# Patient Record
Sex: FEMALE | Race: WHITE | Employment: FULL TIME
[De-identification: names, ages, dates, MRNs, and addresses within clinical notes are randomized per-mention and may not be internally consistent; named-entity substitution may affect disease eponyms.]

---

## 2021-02-02 ENCOUNTER — NURSE TRIAGE (OUTPATIENT)
Dept: OTHER | Facility: CLINIC | Age: 61
End: 2021-02-02

## 2021-02-02 ENCOUNTER — OFFICE VISIT (OUTPATIENT)
Dept: PRIMARY CARE CLINIC | Age: 61
End: 2021-02-02
Payer: COMMERCIAL

## 2021-02-02 VITALS
OXYGEN SATURATION: 97 % | SYSTOLIC BLOOD PRESSURE: 130 MMHG | HEART RATE: 84 BPM | HEIGHT: 63 IN | TEMPERATURE: 96.6 F | BODY MASS INDEX: 34.02 KG/M2 | WEIGHT: 192 LBS | DIASTOLIC BLOOD PRESSURE: 72 MMHG

## 2021-02-02 DIAGNOSIS — L03.114 CELLULITIS OF LEFT UPPER EXTREMITY: Primary | ICD-10-CM

## 2021-02-02 PROCEDURE — G8417 CALC BMI ABV UP PARAM F/U: HCPCS | Performed by: NURSE PRACTITIONER

## 2021-02-02 PROCEDURE — G8484 FLU IMMUNIZE NO ADMIN: HCPCS | Performed by: NURSE PRACTITIONER

## 2021-02-02 PROCEDURE — 99204 OFFICE O/P NEW MOD 45 MIN: CPT | Performed by: NURSE PRACTITIONER

## 2021-02-02 PROCEDURE — G8427 DOCREV CUR MEDS BY ELIG CLIN: HCPCS | Performed by: NURSE PRACTITIONER

## 2021-02-02 PROCEDURE — 1036F TOBACCO NON-USER: CPT | Performed by: NURSE PRACTITIONER

## 2021-02-02 PROCEDURE — 3017F COLORECTAL CA SCREEN DOC REV: CPT | Performed by: NURSE PRACTITIONER

## 2021-02-02 RX ORDER — ACETAMINOPHEN 500 MG
1000 TABLET ORAL EVERY 4 HOURS PRN
COMMUNITY
End: 2022-08-23

## 2021-02-02 RX ORDER — BIOTIN 10000 MCG
1 CAPSULE ORAL DAILY
COMMUNITY

## 2021-02-02 RX ORDER — CEPHALEXIN 500 MG/1
500 CAPSULE ORAL 3 TIMES DAILY
Qty: 30 CAPSULE | Refills: 0 | Status: SHIPPED | OUTPATIENT
Start: 2021-02-02 | End: 2021-02-12

## 2021-02-02 RX ORDER — KETOTIFEN FUMARATE 0.35 MG/ML
1 SOLUTION/ DROPS OPHTHALMIC 2 TIMES DAILY
COMMUNITY

## 2021-02-02 RX ORDER — OMEPRAZOLE 20 MG/1
2 CAPSULE, DELAYED RELEASE ORAL DAILY
COMMUNITY
Start: 2020-11-24 | End: 2021-05-24

## 2021-02-02 RX ORDER — VIT C/B6/B5/MAGNESIUM/HERB 173 50-5-6-5MG
1 CAPSULE ORAL DAILY
COMMUNITY

## 2021-02-02 RX ORDER — FLUCONAZOLE 150 MG/1
150 TABLET ORAL DAILY
Qty: 3 TABLET | Refills: 0 | Status: SHIPPED | OUTPATIENT
Start: 2021-02-02 | End: 2021-02-05

## 2021-02-02 SDOH — ECONOMIC STABILITY: TRANSPORTATION INSECURITY
IN THE PAST 12 MONTHS, HAS LACK OF TRANSPORTATION KEPT YOU FROM MEETINGS, WORK, OR FROM GETTING THINGS NEEDED FOR DAILY LIVING?: NOT ASKED

## 2021-02-02 SDOH — ECONOMIC STABILITY: TRANSPORTATION INSECURITY
IN THE PAST 12 MONTHS, HAS THE LACK OF TRANSPORTATION KEPT YOU FROM MEDICAL APPOINTMENTS OR FROM GETTING MEDICATIONS?: NOT ASKED

## 2021-02-02 SDOH — HEALTH STABILITY: MENTAL HEALTH: HOW MANY STANDARD DRINKS CONTAINING ALCOHOL DO YOU HAVE ON A TYPICAL DAY?: NOT ASKED

## 2021-02-02 SDOH — ECONOMIC STABILITY: FOOD INSECURITY: WITHIN THE PAST 12 MONTHS, YOU WORRIED THAT YOUR FOOD WOULD RUN OUT BEFORE YOU GOT MONEY TO BUY MORE.: NEVER TRUE

## 2021-02-02 ASSESSMENT — ENCOUNTER SYMPTOMS
RESPIRATORY NEGATIVE: 1
COLOR CHANGE: 1
EYES NEGATIVE: 1
GASTROINTESTINAL NEGATIVE: 1
ALLERGIC/IMMUNOLOGIC NEGATIVE: 1

## 2021-02-02 ASSESSMENT — PATIENT HEALTH QUESTIONNAIRE - PHQ9
SUM OF ALL RESPONSES TO PHQ QUESTIONS 1-9: 0
1. LITTLE INTEREST OR PLEASURE IN DOING THINGS: 0

## 2021-02-02 NOTE — TELEPHONE ENCOUNTER
Reason for Disposition   Fever and red area (or area very tender to touch)    Answer Assessment - Initial Assessment Questions  1. ONSET: \"When did the pain start? \"    2/1    2. LOCATION: \"Where is the pain located? \"    Started on Left lower arm, spreading up arm. Does not reach shoulder. 3. PAIN: \"How bad is the pain? \" (Scale 1-10; or mild, moderate, severe)    - MILD (1-3): doesn't interfere with normal activities    - MODERATE (4-7): interferes with normal activities (e.g., work or school) or awakens from sleep    - SEVERE (8-10): excruciating pain, unable to do any normal activities, unable to hold a cup of water    Tender, 3    4. WORK OR EXERCISE: \"Has there been any recent work or exercise that involved this part of the body? \"    No    5. CAUSE: \"What do you think is causing the arm pain? \"  Unsure, has hx of lymphoedema in same. Surgery 9 ago. 6. OTHER SYMPTOMS: \"Do you have any other symptoms? \" (e.g., neck pain, swelling, rash, fever, numbness, weakness)    Swelling, redness, fever    7. PREGNANCY: \"Is there any chance you are pregnant? \" \"When was your last menstrual period? \"  NO    Protocols used: ARM PAIN-ADULT-[place]    Patient called Jael at [facility] pre-service center [facility])  with red flag complaint. Brief description of triage: pt calls c/o \"LEFT arm cellulitis\"    Triage indicates for patient to be seen today. Pt SCREENS RED. Pt advised if unable to obtain MD visit today to go to [facility] or ED. 2nd level triage completed with Dwayne Mccoy NP; provider recommends patient be seen by her MD, Dr. Jose Escobar or ER today. Care advice provided, patient verbalizes understanding; denies any other questions or concerns; instructed to call back for any new or worsening symptoms. Writer provided warm transfer to [place] at [facility] for appointment scheduling. Attention Provider: Thank you for allowing me to participate in the care of your patient.   The patient was connected to triage in

## 2021-02-02 NOTE — PROGRESS NOTES
Lan Richmond [address] [place] [address]  Daria Sinclair [id number]  Dept: [phone]     Patient is here to establish care. Here for evaluation of the following medical concerns:New Patient and Cellulitis (left arm)    Jose Parmar is a 64 y.o. female who presents to the office today for a first visit and to establish a relationship with a new primary care provider. Pt is here to establish care (pt of Dr. Jose Escobar at previous office) and has complaints of left arm pain, swelling, redness that started last night. Last night it was just an area on her upper arm but today has spread to the lower arm and hand as well. The left arm is erythematous, warm to touch, tender to touch and edematous of left upper arm extending to left lower arm. Pt says she had a slight fever this morning of 99.7 and she took motrin around 8am. I discussed with pt starting antibiotics right away, but if it doesn't improve by tomorrow or if it continues to worsen, I want her to go to the ER. If she develops high fever be seen in ER. She requested diflucan due to antibiotics usually causing vaginal yeast infections. 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 Pt will f/u in office in 3 days but agreed that if she becomes worse or does not improve she will go to ER. Patient Active Problem List    Diagnosis Date Noted    Cellulitis of left upper extremity 2021     Patient's BMI is Body mass index is 34.01 kg/m². Social History     Tobacco Use    Smoking status: Former Smoker     Packs/day: 0.50     Years: 10.00     Pack years: 5.00     Types: Cigarettes     Quit date:      Years since quittin.1    Smokeless tobacco: Never Used   Substance Use Topics    Alcohol use: Not Currently    Drug use: Never      History reviewed. No pertinent past medical history.    PHQ-9 Total Score: 0 (2021 11:07 AM) Exam  Vitals signs and nursing note reviewed. Constitutional:       Appearance: Normal appearance. HENT:      Nose: Nose normal.      Mouth/Throat:      Mouth: Mucous membranes are moist.      Pharynx: No oropharyngeal exudate. Eyes:      Extraocular Movements: Extraocular movements intact. Conjunctiva/sclera: Conjunctivae normal.      Pupils: Pupils are equal, round, and reactive to light. Neck:      Musculoskeletal: Normal range of motion. Cardiovascular:      Rate and Rhythm: Normal rate and regular rhythm. Pulses: Normal pulses. Heart sounds: Normal heart sounds. Pulmonary:      Effort: Pulmonary effort is normal. No respiratory distress. Breath sounds: Normal breath sounds. No stridor. No wheezing. Abdominal:      General: Abdomen is flat. Bowel sounds are normal.      Palpations: Abdomen is soft. Tenderness: There is no abdominal tenderness. Musculoskeletal: Normal range of motion. Left elbow: She exhibits swelling. Tenderness found. Left wrist: She exhibits tenderness and swelling. Left upper arm: She exhibits tenderness, swelling and edema. Left forearm: She exhibits tenderness, swelling and edema. Skin:     General: Skin is warm and dry. Capillary Refill: Capillary refill takes less than 2 seconds. Findings: Erythema present. Neurological:      General: No focal deficit present. Mental Status: She is alert. Psychiatric:         Mood and Affect: Mood normal.         Behavior: Behavior normal.         Thought Content:  Thought content normal.       No results found for: WBC, HGB, HCT, MCV, PLT  No results found for: NA, K, CL, CO2, BUN, CREATININE, GLUCOSE, CALCIUM   No results found for: ALT, AST, GGT, ALKPHOS, BILITOT  No results found for: TSHFT4, TSH  No results found for: CHOL  No results found for: TRIG  No results found for: HDL  No results found for: LDLCALC, LDLCHOLESTEROL  No results found for: CHOLHDLRATIO  No results found for: LABA1C  No results found for: [id number]  No results found for: FOLATE  No results found for: IRON, TIBC, FERRITIN  No results found for: VITD25  I personally reviewed testing with patient. Otherwise labs within normal limits  ASSESSMENT AND PLAN  1. Cellulitis of left upper extremity    - cephALEXin (KEFLEX) 500 MG capsule; Take 1 capsule by mouth 3 times daily for 10 days  Dispense: 30 capsule; Refill: 0  - fluconazole (DIFLUCAN) 150 MG tablet; Take 1 tablet by mouth daily for 3 days  Dispense: 3 tablet; Refill: 0     No orders of the defined types were placed in this encounter. Orders Placed This Encounter   Medications    cephALEXin (KEFLEX) 500 MG capsule     Sig: Take 1 capsule by mouth 3 times daily for 10 days     Dispense:  30 capsule     Refill:  0    fluconazole (DIFLUCAN) 150 MG tablet     Sig: Take 1 tablet by mouth daily for 3 days     Dispense:  3 tablet     Refill:  0      Data Unavailable  Health Maintenance Due   Topic Date Due    Hepatitis C screen  1960    HIV screen  10/21/1977    DTaP/Tdap/Td vaccine (1 - Tdap) 10/21/1981    Cervical cancer screen  10/21/1983    Lipid screen  10/21/2002    Diabetes screen  10/21/2002    Breast cancer screen  10/21/2012    Shingles Vaccine (1 of 2) 10/21/2012    Colon cancer screen colonoscopy  10/21/2012    Flu vaccine (1) 09/01/2020      There are no discontinued medications. The patient's past medical, surgical, social, and family history as well as her   current medications and allergies were reviewed as documented in today's encounter. Medications, labs, diagnostic studies, consultations and follow-up as documented in this encounter. Return in about 3 days (around 2/5/2021).   Future Appointments   Date Time Provider Zayra Moore   2/5/2021  8:00 AM CURT Riley CNP     This note was completed by using the assistance of a speech-recognition program. However, inadvertent computerized transcription errors may be present. Although every effort was made to ensure accuracy, no guarantees can be provided that every mistake has been identified and corrected by editing.   Electronically signed by CURT Mendez CNP on 2/2/2021 at 12:13 PM

## 2021-02-05 ENCOUNTER — OFFICE VISIT (OUTPATIENT)
Dept: PRIMARY CARE CLINIC | Age: 61
End: 2021-02-05
Payer: COMMERCIAL

## 2021-02-05 VITALS
TEMPERATURE: 96.4 F | DIASTOLIC BLOOD PRESSURE: 82 MMHG | HEART RATE: 72 BPM | SYSTOLIC BLOOD PRESSURE: 134 MMHG | WEIGHT: 193.2 LBS | BODY MASS INDEX: 34.23 KG/M2 | HEIGHT: 63 IN | OXYGEN SATURATION: 96 %

## 2021-02-05 DIAGNOSIS — Z13.220 SCREENING FOR HYPERLIPIDEMIA: ICD-10-CM

## 2021-02-05 DIAGNOSIS — Z13.1 SCREENING FOR DIABETES MELLITUS: ICD-10-CM

## 2021-02-05 DIAGNOSIS — L03.114 CELLULITIS OF LEFT UPPER EXTREMITY: Primary | ICD-10-CM

## 2021-02-05 PROCEDURE — 3017F COLORECTAL CA SCREEN DOC REV: CPT | Performed by: NURSE PRACTITIONER

## 2021-02-05 PROCEDURE — G8427 DOCREV CUR MEDS BY ELIG CLIN: HCPCS | Performed by: NURSE PRACTITIONER

## 2021-02-05 PROCEDURE — G8417 CALC BMI ABV UP PARAM F/U: HCPCS | Performed by: NURSE PRACTITIONER

## 2021-02-05 PROCEDURE — 99213 OFFICE O/P EST LOW 20 MIN: CPT | Performed by: NURSE PRACTITIONER

## 2021-02-05 PROCEDURE — 1036F TOBACCO NON-USER: CPT | Performed by: NURSE PRACTITIONER

## 2021-02-05 PROCEDURE — G8484 FLU IMMUNIZE NO ADMIN: HCPCS | Performed by: NURSE PRACTITIONER

## 2021-02-05 ASSESSMENT — ENCOUNTER SYMPTOMS
RESPIRATORY NEGATIVE: 1
EYES NEGATIVE: 1
GASTROINTESTINAL NEGATIVE: 1
ALLERGIC/IMMUNOLOGIC NEGATIVE: 1

## 2021-02-05 NOTE — PROGRESS NOTES
[facility] [address] [place] [id number]  Dept: [phone]       Dannielle Zaman is a 64 y.o. female who presents to the office today for evaluation of Cellulitis (follow up)    Pt is here for f.u after being seen 3 days ago for left arm cellulitis. The left arm has greatly improved, it is no longer erythematous or warm to touch and the edema has decreased. Pt says she feels much better. No fever, still feeling a bit fatigued. I instructed pt to continue keflex and she can resume wearing her compression sleeve. Let me know if symptoms return, but overall it looks much better and I believe will continue to fully subside. Pt understands and agreeable to plan. Patient Active Problem List    Diagnosis Date Noted    Cellulitis of left upper extremity 2021     Patient's BMI is Body mass index is 34.22 kg/m². Social History     Tobacco Use    Smoking status: Former Smoker     Packs/day: 0.50     Years: 10.00     Pack years: 5.00     Types: Cigarettes     Quit date:      Years since quittin.1    Smokeless tobacco: Never Used   Substance Use Topics    Alcohol use: Not Currently    Drug use: Never      History reviewed. No pertinent past medical history. PHQ-9 Total Score: 0 (2021 11:07 AM)     Immunization:    There is no immunization history on file for this patient. History reviewed. No pertinent surgical history. History reviewed. No pertinent family history.   Current Outpatient Medications   Medication Sig Dispense Refill    omeprazole (PRILOSEC) 20 MG delayed release capsule Take 2 capsules by mouth daily      ketotifen (ZADITOR) 0.025 % ophthalmic solution Apply 1 drop to eye 2 times daily      acetaminophen (TYLENOL) 500 MG tablet Take 1,000 mg by mouth every 4 hours as needed      FENUGREEK PO Take 1 tablet by mouth daily      Biotin 10 MG CAPS Take 1 capsule by mouth daily  Apple Cider Vinegar 188 MG CAPS Take 1 capsule by mouth daily      Turmeric (QC TUMERIC COMPLEX) 500 MG CAPS Take 1 capsule by mouth daily      cephALEXin (KEFLEX) 500 MG capsule Take 1 capsule by mouth 3 times daily for 10 days 30 capsule 0    fluconazole (DIFLUCAN) 150 MG tablet Take 1 tablet by mouth daily for 3 days 3 tablet 0     No current facility-administered medications for this visit. The patient's past medical, surgical, social, and family history as well as her current medications and allergies were reviewed as documented in today's encounter. Review of Systems   Constitutional: Positive for fatigue. Negative for fever. HENT: Negative. Eyes: Negative. Respiratory: Negative. Cardiovascular: Negative. Gastrointestinal: Negative. Endocrine: Negative. Genitourinary: Negative. Musculoskeletal: Negative. Swelling of left arm    Skin: Negative. Allergic/Immunologic: Negative. Neurological: Negative. Hematological: Negative. Psychiatric/Behavioral: Negative. All other systems reviewed and are negative. /82 (Site: Right Upper Arm, Position: Sitting, Cuff Size: Medium Adult)   Pulse 72   Temp 96.4 °F (35.8 °C) (Temporal)   Ht 5' 3\" (1.6 m)   Wt 193 lb 3.2 oz (87.6 kg)   SpO2 96%   BMI 34.22 kg/m²   Physical Exam  Vitals signs and nursing note reviewed. Constitutional:       Appearance: Normal appearance. HENT:      Nose: Nose normal.      Mouth/Throat:      Mouth: Mucous membranes are moist.      Pharynx: No oropharyngeal exudate. Eyes:      Extraocular Movements: Extraocular movements intact. Conjunctiva/sclera: Conjunctivae normal.      Pupils: Pupils are equal, round, and reactive to light. Neck:      Musculoskeletal: Normal range of motion. Cardiovascular:      Rate and Rhythm: Normal rate and regular rhythm. Pulses: Normal pulses. Heart sounds: Normal heart sounds.    Pulmonary: Effort: Pulmonary effort is normal. No respiratory distress. Breath sounds: Normal breath sounds. No stridor. No wheezing. Abdominal:      General: Abdomen is flat. Bowel sounds are normal.      Palpations: Abdomen is soft. Tenderness: There is no abdominal tenderness. Musculoskeletal: Normal range of motion. Left upper arm: She exhibits swelling and edema. Arms:    Skin:     General: Skin is warm and dry. Capillary Refill: Capillary refill takes less than 2 seconds. Findings: No erythema. Neurological:      General: No focal deficit present. Mental Status: She is alert. Psychiatric:         Mood and Affect: Mood normal.         Behavior: Behavior normal.         Thought Content: Thought content normal.       No results found for: WBC, HGB, HCT, MCV, PLT  No results found for: NA, K, CL, CO2, BUN, CREATININE, GLUCOSE, CALCIUM   No results found for: ALT, AST, GGT, ALKPHOS, BILITOT  No results found for: TSHFT4, TSH  No results found for: CHOL  No results found for: TRIG  No results found for: HDL  No results found for: LDLCALC, LDLCHOLESTEROL  No results found for: CHOLHDLRATIO  No results found for: LABA1C  No results found for: [id number]  No results found for: FOLATE  No results found for: IRON, TIBC, FERRITIN  No results found for: VITD25  I personally reviewed testing with patient. Otherwise labs within normal limits  ASSESSMENT AND PLAN  1. Cellulitis of left upper extremity      2. Screening for diabetes mellitus    - Basic Metabolic Panel; Future    3. Screening for hyperlipidemia    - Lipid Panel;  Future     Orders Placed This Encounter   Procedures    HM MAMMOGRAPHY     This order was created through External Result Entry    Lipid Panel     Standing Status:   Future     Standing Expiration Date:   2/5/2022     Order Specific Question:   Is Patient Fasting?/# of Hours     Answer:   10 hours fasting    Basic Metabolic Panel     Standing Status:   Future Standing Expiration Date:   2/5/2022     PAP SMEAR     This order was created through External Result Entry     No orders of the defined types were placed in this encounter. Data Unavailable  Health Maintenance Due   Topic Date Due    DTaP/Tdap/Td vaccine (1 - Tdap) 10/21/1981    Lipid screen  10/21/2002    Diabetes screen  10/21/2002    Shingles Vaccine (1 of 2) 10/21/2012    Colon cancer screen colonoscopy  10/21/2012    Flu vaccine (1) 09/01/2020      There are no discontinued medications. The patient's past medical, surgical, social, and family history as well as her   current medications and allergies were reviewed as documented in today's encounter. Medications, labs, diagnostic studies, consultations and follow-up as documented in this encounter. No follow-ups on file. No future appointments. This note was completed by using the assistance of a speech-recognition program. However, inadvertent computerized transcription errors may be present. Although every effort was made to ensure accuracy, no guarantees can be provided that every mistake has been identified and corrected by editing.   Electronically signed by Rondell Cheadle, APRN - CNP on 2/5/2021 at 12:57 PM

## 2021-06-09 ENCOUNTER — NURSE TRIAGE (OUTPATIENT)
Dept: OTHER | Facility: CLINIC | Age: 61
End: 2021-06-09

## 2021-06-09 ENCOUNTER — OFFICE VISIT (OUTPATIENT)
Dept: PRIMARY CARE CLINIC | Age: 61
End: 2021-06-09
Payer: COMMERCIAL

## 2021-06-09 VITALS
SYSTOLIC BLOOD PRESSURE: 110 MMHG | HEART RATE: 79 BPM | BODY MASS INDEX: 35.19 KG/M2 | OXYGEN SATURATION: 97 % | DIASTOLIC BLOOD PRESSURE: 70 MMHG | HEIGHT: 62 IN | WEIGHT: 191.2 LBS

## 2021-06-09 DIAGNOSIS — J30.1 SEASONAL ALLERGIC RHINITIS DUE TO POLLEN: Primary | ICD-10-CM

## 2021-06-09 PROBLEM — L03.114 CELLULITIS OF LEFT UPPER EXTREMITY: Status: RESOLVED | Noted: 2021-02-02 | Resolved: 2021-06-09

## 2021-06-09 PROCEDURE — 3017F COLORECTAL CA SCREEN DOC REV: CPT | Performed by: NURSE PRACTITIONER

## 2021-06-09 PROCEDURE — G8427 DOCREV CUR MEDS BY ELIG CLIN: HCPCS | Performed by: NURSE PRACTITIONER

## 2021-06-09 PROCEDURE — 99213 OFFICE O/P EST LOW 20 MIN: CPT | Performed by: NURSE PRACTITIONER

## 2021-06-09 PROCEDURE — 1036F TOBACCO NON-USER: CPT | Performed by: NURSE PRACTITIONER

## 2021-06-09 PROCEDURE — G8417 CALC BMI ABV UP PARAM F/U: HCPCS | Performed by: NURSE PRACTITIONER

## 2021-06-09 RX ORDER — FLUTICASONE PROPIONATE 50 MCG
1 SPRAY, SUSPENSION (ML) NASAL DAILY
COMMUNITY

## 2021-06-09 RX ORDER — ZINC GLUCONATE 50 MG
50 TABLET ORAL DAILY
COMMUNITY

## 2021-06-09 RX ORDER — DIPHENHYDRAMINE HCL 25 MG
25 CAPSULE ORAL NIGHTLY PRN
Qty: 30 CAPSULE | Refills: 3 | COMMUNITY
Start: 2021-06-09 | End: 2021-06-19

## 2021-06-09 RX ORDER — OMEGA-3S/DHA/EPA/FISH OIL/D3 300MG-1000
400 CAPSULE ORAL DAILY
COMMUNITY

## 2021-06-09 RX ORDER — MULTIVIT WITH MINERALS/LUTEIN
250 TABLET ORAL DAILY
COMMUNITY

## 2021-06-09 RX ORDER — PSEUDOEPHEDRINE HCL 30 MG/5 ML
15 LIQUID (ML) ORAL 4 TIMES DAILY PRN
COMMUNITY

## 2021-06-09 RX ORDER — LORATADINE 10 MG/1
10 TABLET ORAL DAILY
Qty: 30 TABLET | Refills: 3 | COMMUNITY
Start: 2021-06-09

## 2021-06-09 ASSESSMENT — ENCOUNTER SYMPTOMS
GASTROINTESTINAL NEGATIVE: 1
EYES NEGATIVE: 1
RESPIRATORY NEGATIVE: 1
RHINORRHEA: 1

## 2021-06-09 NOTE — PROGRESS NOTES
Lan Richmond [address] [place] [id number]  Dept: [phone]       Cole Baires is a 64 y.o. female who presents to the office today for evaluation of Allergic Rhinitis     Pt is here with concerns of worsening allergies. She is taking flonase at night before bed and using peppermint oil diffuser at night but still having a lot of issues with congestion and post nasal drainage. Discussed adding 25mg benadryl at night, continuing flonase, and adding claritin in the morning and she how she does. Pt has not gotten labs done yet, reminded to do so. Pt understands and agreeable to plan. Patient Active Problem List    Diagnosis Date Noted    Seasonal allergic rhinitis due to pollen 2021     Patient's BMI is Body mass index is 34.97 kg/m². BMI was elevated today, and weight loss plan recommended is : conventional weight loss and daily exercise regimen. Social History     Tobacco Use    Smoking status: Former Smoker     Packs/day: 0.50     Years: 10.00     Pack years: 5.00     Types: Cigarettes     Quit date:      Years since quittin.4    Smokeless tobacco: Never Used   Vaping Use    Vaping Use: Never used   Substance Use Topics    Alcohol use: Not Currently    Drug use: Never      Past Medical History:   Diagnosis Date    Allergic rhinitis     Cellulitis of left upper extremity 2021      No data recorded   Immunization:    There is no immunization history on file for this patient.   Past Surgical History:   Procedure Laterality Date    MASTECTOMY       Family History   Problem Relation Age of Onset    No Known Problems Mother     No Known Problems Father      Current Outpatient Medications   Medication Sig Dispense Refill    vitamin D3 (CHOLECALCIFEROL) 10 MCG (400 UNIT) TABS tablet Take 400 Units by mouth daily      zinc gluconate 50 MG tablet Take 50 mg by mouth daily      Ascorbic Acid (VITAMIN C) 250 MG tablet Take 250 mg by mouth daily      Quercetin 250 MG TABS Take by mouth      fluticasone (FLONASE) 50 MCG/ACT nasal spray 1 spray by Each Nostril route daily      pseudoephedrine (SUDAFED) 30 MG/5ML syrup Take 15 mg by mouth 4 times daily as needed      loratadine (CLARITIN) 10 MG tablet Take 1 tablet by mouth daily 30 tablet 3    diphenhydrAMINE (BENADRYL ALLERGY) 25 MG capsule Take 1 capsule by mouth nightly as needed for Itching or Allergies 30 capsule 3    omeprazole (PRILOSEC) 20 MG delayed release capsule TAKE 2 CAPSULES BY MOUTH EVERY  capsule 4    ketotifen (ZADITOR) 0.025 % ophthalmic solution Apply 1 drop to eye 2 times daily      acetaminophen (TYLENOL) 500 MG tablet Take 1,000 mg by mouth every 4 hours as needed      FENUGREEK PO Take 1 tablet by mouth daily      Biotin 10 MG CAPS Take 1 capsule by mouth daily      Apple Cider Vinegar 188 MG CAPS Take 1 capsule by mouth daily      Turmeric (QC TUMERIC COMPLEX) 500 MG CAPS Take 1 capsule by mouth daily       No current facility-administered medications for this visit. The patient's past medical, surgical, social, and family history as well as her current medications and allergies were reviewed as documented in today's encounter. Review of Systems   Constitutional: Negative. HENT: Positive for congestion, postnasal drip and rhinorrhea. Eyes: Negative. Respiratory: Negative. Cardiovascular: Negative. Gastrointestinal: Negative. Endocrine: Negative. Genitourinary: Negative. Musculoskeletal: Negative. Skin: Negative. Allergic/Immunologic: Positive for environmental allergies. Neurological: Negative. Hematological: Negative. Psychiatric/Behavioral: Negative. All other systems reviewed and are negative.      /70 (Site: Right Upper Arm, Position: Sitting, Cuff Size: Medium Adult)   Pulse 79   Ht 5' 2\" (1.575 m)   Wt 191 lb 3.2 oz (86.7 kg)   SpO2 97%   BMI 34.97 kg/m²   Physical Exam  Vitals and nursing LABA1C  No results found for: [id number]  No results found for: FOLATE  No results found for: IRON, TIBC, FERRITIN  No results found for: VITD25  I personally reviewed testing with patient. Otherwise labs within normal limits  ASSESSMENT AND PLAN  1. Seasonal allergic rhinitis due to pollen       No orders of the defined types were placed in this encounter. Orders Placed This Encounter   Medications    loratadine (CLARITIN) 10 MG tablet     Sig: Take 1 tablet by mouth daily     Dispense:  30 tablet     Refill:  3    diphenhydrAMINE (BENADRYL ALLERGY) 25 MG capsule     Sig: Take 1 capsule by mouth nightly as needed for Itching or Allergies     Dispense:  30 capsule     Refill:  3      Data Unavailable  Health Maintenance Due   Topic Date Due    COVID-19 Vaccine (1) Never done    DTaP/Tdap/Td vaccine (1 - Tdap) Never done    Lipid screen  Never done    Diabetes screen  Never done    Shingles Vaccine (1 of 2) Never done    Colon cancer screen colonoscopy  Never done      There are no discontinued medications. The patient's past medical, surgical, social, and family history as well as her   current medications and allergies were reviewed as documented in today's encounter. Medications, labs, diagnostic studies, consultations and follow-up as documented in this encounter. Return if symptoms worsen or fail to improve. No future appointments. This note was completed by using the assistance of a speech-recognition program. However, inadvertent computerized transcription errors may be present. Although every effort was made to ensure accuracy, no guarantees can be provided that every mistake has been identified and corrected by editing.   Electronically signed by CURT Zambrano CNP on 6/9/2021 at 4:09 PM

## 2021-06-09 NOTE — TELEPHONE ENCOUNTER
Received call from Daniel Hong at [address] [facility])  with [employer] Brief description of triage: allergies seem to be getting worse this year- she is experiencing brief periods of coughing. Not currently experiencing anything but wants to be seen as the allergies are worse now than they have ever been-    Triage indicates for patient to today or tomorrow. Care advice provided, patient verbalizes understanding; denies any other questions or concerns; instructed to call back for any new or worsening symptoms. Writer provided warm transfer to Tino Ojeda  at [facility] for appointment scheduling. Attention Provider: Thank you for allowing me to participate in the care of your patient. The patient was connected to triage in response to information provided to the [facility]. Please do not respond through this encounter as the response is not directed to a shared pool. Reason for Disposition   Patient wants to be seen    Answer Assessment - Initial Assessment Questions  1. SYMPTOM: \"What's the main symptom you're concerned about? \" (e.g., runny nose, stuffiness, sneezing, itching)        Occasional periods of coughing  Occasional sinus pressure  Occasional itchy and watery eyes    Not currently experiencing anything   2. SEVERITY: \"How bad is it? \" \"What does it keep you from doing? \" (e.g., sleeping, working)       Worst than in previous years    3. EYES: \"Are the eyes also red, watery, and itchy? \"      Denies    4. TRIGGER: \"What pollen or other allergic substance do you think is causing the symptoms? \"       Pollen    5. TREATMENT: \"What medicine are you using? \" \"What medicine worked best in the past?\"        Patient has been taking Flonase that was prescribed by the MD when she has a flair up  Also uses eye drops Zatador  Also uses basic Sudafed    6. OTHER SYMPTOMS: \"Do you have any other symptoms? \" (e.g., coughing, difficulty breathing, wheezing)        Occasional cough  7.  PREGNANCY: \"Is there any chance you are pregnant? \" \"When was your last menstrual period? \"      N/a age    Protocols used: NASAL ALLERGIES (HAY FEVER)-ADULT-[place]  see above documentation

## 2022-03-04 ENCOUNTER — NURSE TRIAGE (OUTPATIENT)
Dept: OTHER | Facility: CLINIC | Age: 62
End: 2022-03-04

## 2022-03-04 NOTE — TELEPHONE ENCOUNTER
Received call from Carolina Tirado at [facility] with [employer]. Subjective: Caller states \"I slipped on the ice about three weeks ago and hit my shin - I am still having pain\"     Current Symptoms: No bruising on the left shin, swelling in the shin, ankle and foot area, pain in left shin (up towards the knee - hurts only with pressure), \"about a week after I fell - just above my ankle it looked like a line straight across that looked like a bruise - a day or two after I noticed what appeared to be a bruise near my ankle bone - last evening I notice my ankle an foot are swollen\"    Onset: 3 weeks ago; gradual    Associated Symptoms: NA    Pain Severity: 5/10; aching; intermittent (only with pressure)    Temperature: No fever     What has been tried: Ibuprofen    LMP: NA Pregnant: No    Recommended disposition: See PCP within 3 Days or in a walk in care    Care advice provided, patient verbalizes understanding; denies any other questions or concerns; instructed to call back for any new or worsening symptoms. Patient/Caller agrees with recommended disposition; writer provided warm transfer to [facility] for appointment scheduling     Attention Provider: Thank you for allowing me to participate in the care of your patient. The patient was connected to triage in response to information provided to the ECC/PSC. Please do not respond through this encounter as the response is not directed to a shared pool.           Reason for Disposition   Injury is still painful or swollen after 2 weeks    Protocols used: LEG INJURY-ADULT-[place]

## 2022-03-07 ENCOUNTER — OFFICE VISIT (OUTPATIENT)
Dept: PRIMARY CARE CLINIC | Age: 62
End: 2022-03-07
Payer: COMMERCIAL

## 2022-03-07 VITALS
OXYGEN SATURATION: 95 % | SYSTOLIC BLOOD PRESSURE: 128 MMHG | WEIGHT: 192 LBS | HEART RATE: 84 BPM | BODY MASS INDEX: 35.33 KG/M2 | DIASTOLIC BLOOD PRESSURE: 68 MMHG | HEIGHT: 62 IN

## 2022-03-07 DIAGNOSIS — S80.12XA LEG HEMATOMA, LEFT, INITIAL ENCOUNTER: Primary | ICD-10-CM

## 2022-03-07 PROCEDURE — G9899 SCRN MAM PERF RSLTS DOC: HCPCS | Performed by: FAMILY MEDICINE

## 2022-03-07 PROCEDURE — G8417 CALC BMI ABV UP PARAM F/U: HCPCS | Performed by: FAMILY MEDICINE

## 2022-03-07 PROCEDURE — G8484 FLU IMMUNIZE NO ADMIN: HCPCS | Performed by: FAMILY MEDICINE

## 2022-03-07 PROCEDURE — 99213 OFFICE O/P EST LOW 20 MIN: CPT | Performed by: FAMILY MEDICINE

## 2022-03-07 PROCEDURE — 3017F COLORECTAL CA SCREEN DOC REV: CPT | Performed by: FAMILY MEDICINE

## 2022-03-07 PROCEDURE — G8427 DOCREV CUR MEDS BY ELIG CLIN: HCPCS | Performed by: FAMILY MEDICINE

## 2022-03-07 PROCEDURE — 1036F TOBACCO NON-USER: CPT | Performed by: FAMILY MEDICINE

## 2022-03-07 SDOH — ECONOMIC STABILITY: FOOD INSECURITY: WITHIN THE PAST 12 MONTHS, THE FOOD YOU BOUGHT JUST DIDN'T LAST AND YOU DIDN'T HAVE MONEY TO GET MORE.: NEVER TRUE

## 2022-03-07 SDOH — ECONOMIC STABILITY: FOOD INSECURITY: WITHIN THE PAST 12 MONTHS, YOU WORRIED THAT YOUR FOOD WOULD RUN OUT BEFORE YOU GOT MONEY TO BUY MORE.: NEVER TRUE

## 2022-03-07 ASSESSMENT — PATIENT HEALTH QUESTIONNAIRE - PHQ9
SUM OF ALL RESPONSES TO PHQ9 QUESTIONS 1 & 2: 0
1. LITTLE INTEREST OR PLEASURE IN DOING THINGS: 0
SUM OF ALL RESPONSES TO PHQ QUESTIONS 1-9: 0
2. FEELING DOWN, DEPRESSED OR HOPELESS: 0
SUM OF ALL RESPONSES TO PHQ QUESTIONS 1-9: 0

## 2022-03-07 ASSESSMENT — SOCIAL DETERMINANTS OF HEALTH (SDOH): HOW HARD IS IT FOR YOU TO PAY FOR THE VERY BASICS LIKE FOOD, HOUSING, MEDICAL CARE, AND HEATING?: NOT HARD AT ALL

## 2022-03-07 NOTE — PROGRESS NOTES
Subjective:      Patient ID: Sergio Eduardo is a 63 y.o. female. Had a fall  Had a swelling up by the knee  It mostly went away then bruising swelling and discomfort at the ankle      Review of Systems   Constitutional: Negative. All other systems reviewed and are negative. Objective:   Physical Exam  Vitals reviewed. Constitutional:       Appearance: Normal appearance. Cardiovascular:      Rate and Rhythm: Normal rate. Pulmonary:      Effort: Pulmonary effort is normal.   Skin:     General: Skin is warm. Findings: Bruising present. Neurological:      Mental Status: She is alert. Assessment:      1. Leg hematoma, left, initial encounter            Plan:      Mirian Keene was seen today for swelling and fall.     Diagnoses and all orders for this visit:    Leg hematoma, left, initial encounter      Supportive care        Electronically signed by Masood Wing MD on 3/7/2022 at 10:59 AM

## 2022-08-22 ENCOUNTER — TELEPHONE (OUTPATIENT)
Dept: PRIMARY CARE CLINIC | Age: 62
End: 2022-08-22

## 2022-08-22 NOTE — TELEPHONE ENCOUNTER
----- Message from Dorian Negrete sent at 8/22/2022  4:00 PM EDT -----  Subject: Appointment Request    Reason for Call: Established Patient Appointment needed: Routine Existing   Condition Follow Up    QUESTIONS    Reason for appointment request? Available appointments did not meet   patient need     Additional Information for Provider? PT called in to schedule med follow   up. Pt was previous pt of Dr Smith Mcdonnell. Pt needs medication follow up prior to   08/28. Pt was advised by pharmacy that office stated she needs appt prior   to refilling medication. Please advise pt.  No appts available.   ---------------------------------------------------------------------------  --------------  Clarissa JACOB  [phone]; OK to leave message on voicemail  ---------------------------------------------------------------------------  --------------  SCRIPT ANSWERS  LANNYID Screen: Nena Holloway

## 2022-08-23 ENCOUNTER — OFFICE VISIT (OUTPATIENT)
Dept: PRIMARY CARE CLINIC | Age: 62
End: 2022-08-23
Payer: COMMERCIAL

## 2022-08-23 VITALS
BODY MASS INDEX: 35.7 KG/M2 | HEIGHT: 62 IN | SYSTOLIC BLOOD PRESSURE: 122 MMHG | WEIGHT: 194 LBS | HEART RATE: 78 BPM | DIASTOLIC BLOOD PRESSURE: 72 MMHG | OXYGEN SATURATION: 95 %

## 2022-08-23 DIAGNOSIS — Z13.1 SCREENING FOR DIABETES MELLITUS (DM): ICD-10-CM

## 2022-08-23 DIAGNOSIS — Z85.3 HX: BREAST CANCER: ICD-10-CM

## 2022-08-23 DIAGNOSIS — R63.5 WEIGHT GAIN: ICD-10-CM

## 2022-08-23 DIAGNOSIS — Z13.6 SCREENING FOR CARDIOVASCULAR CONDITION: ICD-10-CM

## 2022-08-23 DIAGNOSIS — Z13.0 SCREENING, ANEMIA, DEFICIENCY, IRON: ICD-10-CM

## 2022-08-23 DIAGNOSIS — I89.0 LYMPHEDEMA OF LEFT UPPER EXTREMITY: ICD-10-CM

## 2022-08-23 DIAGNOSIS — K21.9 GASTROESOPHAGEAL REFLUX DISEASE WITHOUT ESOPHAGITIS: Primary | ICD-10-CM

## 2022-08-23 PROCEDURE — 1036F TOBACCO NON-USER: CPT | Performed by: NURSE PRACTITIONER

## 2022-08-23 PROCEDURE — 99214 OFFICE O/P EST MOD 30 MIN: CPT | Performed by: NURSE PRACTITIONER

## 2022-08-23 PROCEDURE — G8417 CALC BMI ABV UP PARAM F/U: HCPCS | Performed by: NURSE PRACTITIONER

## 2022-08-23 PROCEDURE — G9899 SCRN MAM PERF RSLTS DOC: HCPCS | Performed by: NURSE PRACTITIONER

## 2022-08-23 PROCEDURE — G8427 DOCREV CUR MEDS BY ELIG CLIN: HCPCS | Performed by: NURSE PRACTITIONER

## 2022-08-23 PROCEDURE — 3017F COLORECTAL CA SCREEN DOC REV: CPT | Performed by: NURSE PRACTITIONER

## 2022-08-23 RX ORDER — OMEPRAZOLE 20 MG/1
CAPSULE, DELAYED RELEASE ORAL
Qty: 180 CAPSULE | Refills: 1 | Status: SHIPPED | OUTPATIENT
Start: 2022-08-23

## 2022-08-23 ASSESSMENT — ENCOUNTER SYMPTOMS
RHINORRHEA: 0
NAUSEA: 0
DIARRHEA: 0
COLOR CHANGE: 0
SORE THROAT: 0
ABDOMINAL PAIN: 0
VOMITING: 0
SHORTNESS OF BREATH: 0
CHEST TIGHTNESS: 0

## 2022-08-23 NOTE — PROGRESS NOTES
Packs/day: 0.50     Years: 10.00     Pack years: 5.00     Types: Cigarettes     Quit date: 18     Years since quittin.6    Smokeless tobacco: Never   Substance Use Topics    Alcohol use: Not Currently      Current Outpatient Medications   Medication Sig Dispense Refill    omeprazole (PRILOSEC) 20 MG delayed release capsule TAKE 2 CAPSULES BY MOUTH EVERY  capsule 1    vitamin D3 (CHOLECALCIFEROL) 10 MCG (400 UNIT) TABS tablet Take 400 Units by mouth daily      zinc gluconate 50 MG tablet Take 50 mg by mouth daily      Ascorbic Acid (VITAMIN C) 250 MG tablet Take 250 mg by mouth daily      Quercetin 250 MG TABS Take by mouth      fluticasone (FLONASE) 50 MCG/ACT nasal spray 1 spray by Each Nostril route daily      pseudoephedrine (SUDAFED) 30 MG/5ML syrup Take 15 mg by mouth 4 times daily as needed      loratadine (CLARITIN) 10 MG tablet Take 1 tablet by mouth daily 30 tablet 3    ketotifen (ZADITOR) 0.025 % ophthalmic solution Apply 1 drop to eye 2 times daily      Biotin 10 MG CAPS Take 1 capsule by mouth daily      Apple Cider Vinegar 188 MG CAPS Take 1 capsule by mouth daily      turmeric 500 MG CAPS Take 1 capsule by mouth daily       No current facility-administered medications for this visit.      No Known Allergies    Health Maintenance   Topic Date Due    COVID-19 Vaccine (1) Never done    DTaP/Tdap/Td vaccine (1 - Tdap) Never done    Diabetes screen  Never done    Lipids  Never done    Colorectal Cancer Screen  Never done    Shingles vaccine (1 of 2) Never done    Flu vaccine (1) 2022    Breast cancer screen  2023    Depression Screen  2023    Cervical cancer screen  2024    Hepatitis A vaccine  Aged Out    Hepatitis B vaccine  Aged Out    Hib vaccine  Aged Out    Meningococcal (ACWY) vaccine  Aged Out    Pneumococcal 0-64 years Vaccine  Aged Out    Hepatitis C screen  Discontinued    HIV screen  Discontinued       Subjective:      Review of Systems   Constitutional: Negative for activity change, fatigue and fever. HENT:  Negative for congestion, rhinorrhea and sore throat. Eyes:  Negative for visual disturbance. Respiratory:  Negative for chest tightness and shortness of breath. Cardiovascular:  Negative for chest pain and palpitations. Gastrointestinal:  Negative for abdominal pain, diarrhea, nausea and vomiting. Endocrine: Negative for polydipsia. Genitourinary:  Negative for difficulty urinating. Musculoskeletal:  Negative for arthralgias and myalgias. Lymphedema LUE   Skin:  Negative for color change. Neurological:  Negative for weakness and headaches. Psychiatric/Behavioral:  Negative for behavioral problems. The patient is not nervous/anxious. All other systems reviewed and are negative. Objective:   /72   Pulse 78   Ht 5' 2\" (1.575 m)   Wt 194 lb (88 kg)   SpO2 95%   BMI 35.48 kg/m²   Physical Exam  Vitals reviewed. Constitutional:       General: She is not in acute distress. Appearance: Normal appearance. She is obese. HENT:      Head: Normocephalic. Eyes:      Pupils: Pupils are equal, round, and reactive to light. Cardiovascular:      Rate and Rhythm: Normal rate and regular rhythm. Pulses: Normal pulses. Heart sounds: Normal heart sounds. Pulmonary:      Effort: Pulmonary effort is normal.      Breath sounds: Normal breath sounds. Abdominal:      General: There is no distension. Musculoskeletal:         General: Normal range of motion. Right lower leg: No edema. Left lower leg: No edema. Lymphadenopathy:      Comments: LUE lymphedema, compression garment in place    Skin:     General: Skin is warm and dry. Capillary Refill: Capillary refill takes less than 2 seconds. Neurological:      General: No focal deficit present. Mental Status: She is alert and oriented to person, place, and time.    Psychiatric:         Mood and Affect: Mood normal.         Behavior: Behavior normal.         :       Diagnosis Orders   1. Gastroesophageal reflux disease without esophagitis  omeprazole (PRILOSEC) 20 MG delayed release capsule      2. Lymphedema of left upper extremity        3. HX: breast cancer        4. Weight gain  TSH    T4      5. Screening for cardiovascular condition  Lipid Panel      6. Screening for diabetes mellitus (DM)  Comprehensive Metabolic Panel      7. Screening, anemia, deficiency, iron  CBC with Auto Differential                :          1. Gastroesophageal reflux disease without esophagitis  Stable, continue PPI and antireflux diet   - omeprazole (PRILOSEC) 20 MG delayed release capsule; TAKE 2 CAPSULES BY MOUTH EVERY DAY  Dispense: 180 capsule; Refill: 1    2. HX: breast cancer  3. Lymphedema of left upper extremity  Stable, continue compression garment. Imaging Q6months with GYN    4. Screening for cardiovascular condition  - Lipid Panel; Future    5. Screening for diabetes mellitus (DM)  - Comprehensive Metabolic Panel; Future    6. Screening, anemia, deficiency, iron  - CBC with Auto Differential; Future    7. Weight gain  Rule out thyroid dysfunction, lengthy discussion about diet changes and exercise. Encouraged to f/u in 4-6 months for weight check in and accountability, she will consider   - TSH; Future  - T4; Future    Return if symptoms worsen or fail to improve. Patient given educational materials - see patient instructions. Discussed use, benefit, and side effects of prescribed medications. All patient questions answered. Pt voiced understanding. Reviewed health maintenance. Instructed to continue current medications, diet and exercise. Patient agreed with treatment plan. Follow up as directed.        Electronicallysigned by CURT Hackett CNP on 8/24/2022 at 12:58 PM

## 2023-02-18 DIAGNOSIS — K21.9 GASTROESOPHAGEAL REFLUX DISEASE WITHOUT ESOPHAGITIS: ICD-10-CM

## 2023-02-20 RX ORDER — OMEPRAZOLE 20 MG/1
CAPSULE, DELAYED RELEASE ORAL
Qty: 180 CAPSULE | Refills: 1 | Status: SHIPPED | OUTPATIENT
Start: 2023-02-20

## 2023-03-20 ENCOUNTER — HOSPITAL ENCOUNTER (OUTPATIENT)
Age: 63
Setting detail: SPECIMEN
Discharge: HOME | End: 2023-03-20

## 2023-03-20 ENCOUNTER — OFFICE VISIT (OUTPATIENT)
Dept: PRIMARY CARE CLINIC | Age: 63
End: 2023-03-20
Payer: COMMERCIAL

## 2023-03-20 VITALS
HEIGHT: 63 IN | HEART RATE: 84 BPM | OXYGEN SATURATION: 94 % | DIASTOLIC BLOOD PRESSURE: 76 MMHG | SYSTOLIC BLOOD PRESSURE: 120 MMHG | WEIGHT: 187.4 LBS | BODY MASS INDEX: 33.2 KG/M2 | TEMPERATURE: 98.6 F

## 2023-03-20 DIAGNOSIS — R05.1 ACUTE COUGH: ICD-10-CM

## 2023-03-20 DIAGNOSIS — J06.9 VIRAL UPPER RESPIRATORY TRACT INFECTION: ICD-10-CM

## 2023-03-20 DIAGNOSIS — H69.83 ACUTE DYSFUNCTION OF EUSTACHIAN TUBE, BILATERAL: ICD-10-CM

## 2023-03-20 DIAGNOSIS — J01.00 ACUTE NON-RECURRENT MAXILLARY SINUSITIS: ICD-10-CM

## 2023-03-20 DIAGNOSIS — J06.9 VIRAL UPPER RESPIRATORY TRACT INFECTION: Primary | ICD-10-CM

## 2023-03-20 PROCEDURE — 3017F COLORECTAL CA SCREEN DOC REV: CPT

## 2023-03-20 PROCEDURE — G8484 FLU IMMUNIZE NO ADMIN: HCPCS

## 2023-03-20 PROCEDURE — G8417 CALC BMI ABV UP PARAM F/U: HCPCS

## 2023-03-20 PROCEDURE — 99213 OFFICE O/P EST LOW 20 MIN: CPT

## 2023-03-20 PROCEDURE — 1036F TOBACCO NON-USER: CPT

## 2023-03-20 PROCEDURE — G8427 DOCREV CUR MEDS BY ELIG CLIN: HCPCS

## 2023-03-20 RX ORDER — BENZONATATE 200 MG/1
200 CAPSULE ORAL 3 TIMES DAILY PRN
Qty: 30 CAPSULE | Refills: 0 | Status: SHIPPED | OUTPATIENT
Start: 2023-03-20 | End: 2023-03-30

## 2023-03-20 RX ORDER — PREDNISONE 20 MG/1
20 TABLET ORAL DAILY
Qty: 5 TABLET | Refills: 0 | Status: SHIPPED | OUTPATIENT
Start: 2023-03-20 | End: 2023-03-25

## 2023-03-20 ASSESSMENT — ENCOUNTER SYMPTOMS
WHEEZING: 0
SINUS PRESSURE: 1
SHORTNESS OF BREATH: 0
SORE THROAT: 1
COUGH: 1
RHINORRHEA: 1
SINUS PAIN: 1
EYE DISCHARGE: 0

## 2023-03-20 NOTE — PROGRESS NOTES
external ear normal. Tympanic membrane is not injected or erythematous. Nose: Mucosal edema present. No congestion. Mouth/Throat:      Mouth: Mucous membranes are moist.      Pharynx: Oropharynx is clear. Uvula midline. No posterior oropharyngeal erythema. Eyes:      Conjunctiva/sclera: Conjunctivae normal.   Cardiovascular:      Rate and Rhythm: Normal rate and regular rhythm. Heart sounds: Normal heart sounds. Pulmonary:      Effort: Pulmonary effort is normal.      Breath sounds: Normal breath sounds. Musculoskeletal:         General: Normal range of motion. Cervical back: Neck supple. Lymphadenopathy:      Cervical: No cervical adenopathy. Skin:     General: Skin is warm and dry. Capillary Refill: Capillary refill takes less than 2 seconds. Neurological:      Mental Status: She is alert and oriented to person, place, and time. Psychiatric:         Mood and Affect: Mood normal.         Behavior: Behavior normal.         :          1. Viral upper respiratory tract infection  -     COVID-19; Future  2. Acute cough  -     benzonatate (TESSALON) 200 MG capsule; Take 1 capsule by mouth 3 times daily as needed for Cough, Disp-30 capsule, R-0Normal  3. Acute non-recurrent maxillary sinusitis  -     predniSONE (DELTASONE) 20 MG tablet; Take 1 tablet by mouth daily for 5 days, Disp-5 tablet, R-0Normal  4. Acute dysfunction of Eustachian tube, bilateral  -     predniSONE (DELTASONE) 20 MG tablet; Take 1 tablet by mouth daily for 5 days, Disp-5 tablet, R-0Normal     :      Return if symptoms worsen or fail to improve.     Orders Placed This Encounter   Medications    predniSONE (DELTASONE) 20 MG tablet     Sig: Take 1 tablet by mouth daily for 5 days     Dispense:  5 tablet     Refill:  0    benzonatate (TESSALON) 200 MG capsule     Sig: Take 1 capsule by mouth 3 times daily as needed for Cough     Dispense:  30 capsule     Refill:  0     PCR COVID-19 swab obtained in office, will call

## 2023-03-20 NOTE — PATIENT INSTRUCTIONS
Will call with results. Continue with supportive treatment. Push oral hydration and rest.  Continue taking Claritin and Flonase daily. Use cough suppressant as needed. Complete short course of oral steroids. Follow-up if worsening or no improvement.

## 2023-03-21 LAB
SARS-COV-2 RNA RESP QL NAA+PROBE: NORMAL
SARS-COV-2 RNA RESP QL NAA+PROBE: NOT DETECTED
SOURCE: NORMAL

## 2023-03-22 NOTE — RESULT ENCOUNTER NOTE
Pt advised of Negative Covid results. States feeling somewhat better. Advised if any other problems or questions to call.

## 2023-06-13 ENCOUNTER — OFFICE VISIT (OUTPATIENT)
Dept: PRIMARY CARE CLINIC | Age: 63
End: 2023-06-13
Payer: COMMERCIAL

## 2023-06-13 VITALS
RESPIRATION RATE: 16 BRPM | OXYGEN SATURATION: 96 % | SYSTOLIC BLOOD PRESSURE: 132 MMHG | BODY MASS INDEX: 33.48 KG/M2 | HEART RATE: 86 BPM | WEIGHT: 189 LBS | DIASTOLIC BLOOD PRESSURE: 76 MMHG

## 2023-06-13 DIAGNOSIS — T36.95XA ANTIBIOTIC-INDUCED YEAST INFECTION: ICD-10-CM

## 2023-06-13 DIAGNOSIS — L03.114 CELLULITIS OF LEFT UPPER ARM: Primary | ICD-10-CM

## 2023-06-13 DIAGNOSIS — B37.9 ANTIBIOTIC-INDUCED YEAST INFECTION: ICD-10-CM

## 2023-06-13 PROCEDURE — G8427 DOCREV CUR MEDS BY ELIG CLIN: HCPCS | Performed by: NURSE PRACTITIONER

## 2023-06-13 PROCEDURE — 1036F TOBACCO NON-USER: CPT | Performed by: NURSE PRACTITIONER

## 2023-06-13 PROCEDURE — G8417 CALC BMI ABV UP PARAM F/U: HCPCS | Performed by: NURSE PRACTITIONER

## 2023-06-13 PROCEDURE — 3017F COLORECTAL CA SCREEN DOC REV: CPT | Performed by: NURSE PRACTITIONER

## 2023-06-13 PROCEDURE — 99214 OFFICE O/P EST MOD 30 MIN: CPT | Performed by: NURSE PRACTITIONER

## 2023-06-13 RX ORDER — CEPHALEXIN 500 MG/1
500 CAPSULE ORAL 2 TIMES DAILY
Qty: 14 CAPSULE | Refills: 0 | Status: SHIPPED | OUTPATIENT
Start: 2023-06-13 | End: 2023-06-20

## 2023-06-13 RX ORDER — FLUCONAZOLE 150 MG/1
150 TABLET ORAL ONCE
Qty: 1 TABLET | Refills: 0 | Status: SHIPPED | OUTPATIENT
Start: 2023-06-13 | End: 2023-06-13

## 2023-06-13 SDOH — ECONOMIC STABILITY: FOOD INSECURITY: WITHIN THE PAST 12 MONTHS, THE FOOD YOU BOUGHT JUST DIDN'T LAST AND YOU DIDN'T HAVE MONEY TO GET MORE.: NEVER TRUE

## 2023-06-13 SDOH — ECONOMIC STABILITY: INCOME INSECURITY: HOW HARD IS IT FOR YOU TO PAY FOR THE VERY BASICS LIKE FOOD, HOUSING, MEDICAL CARE, AND HEATING?: NOT HARD AT ALL

## 2023-06-13 SDOH — ECONOMIC STABILITY: HOUSING INSECURITY
IN THE LAST 12 MONTHS, WAS THERE A TIME WHEN YOU DID NOT HAVE A STEADY PLACE TO SLEEP OR SLEPT IN A SHELTER (INCLUDING NOW)?: NO

## 2023-06-13 SDOH — ECONOMIC STABILITY: FOOD INSECURITY: WITHIN THE PAST 12 MONTHS, YOU WORRIED THAT YOUR FOOD WOULD RUN OUT BEFORE YOU GOT MONEY TO BUY MORE.: NEVER TRUE

## 2023-06-13 ASSESSMENT — PATIENT HEALTH QUESTIONNAIRE - PHQ9
SUM OF ALL RESPONSES TO PHQ9 QUESTIONS 1 & 2: 0
2. FEELING DOWN, DEPRESSED OR HOPELESS: 0
SUM OF ALL RESPONSES TO PHQ QUESTIONS 1-9: 0
1. LITTLE INTEREST OR PLEASURE IN DOING THINGS: 0

## 2023-06-19 ASSESSMENT — ENCOUNTER SYMPTOMS
COLOR CHANGE: 0
RHINORRHEA: 0
DIARRHEA: 0
VOMITING: 0
SORE THROAT: 0
ABDOMINAL PAIN: 0
CHEST TIGHTNESS: 0
NAUSEA: 0
SHORTNESS OF BREATH: 0

## 2023-08-20 DIAGNOSIS — K21.9 GASTROESOPHAGEAL REFLUX DISEASE WITHOUT ESOPHAGITIS: ICD-10-CM

## 2023-08-21 RX ORDER — OMEPRAZOLE 20 MG/1
CAPSULE, DELAYED RELEASE ORAL
Qty: 180 CAPSULE | Refills: 1 | Status: SHIPPED | OUTPATIENT
Start: 2023-08-21

## 2023-10-31 ENCOUNTER — OFFICE VISIT (OUTPATIENT)
Dept: PRIMARY CARE CLINIC | Age: 63
End: 2023-10-31
Payer: COMMERCIAL

## 2023-10-31 VITALS
HEART RATE: 75 BPM | HEIGHT: 63 IN | SYSTOLIC BLOOD PRESSURE: 124 MMHG | OXYGEN SATURATION: 99 % | BODY MASS INDEX: 33.84 KG/M2 | DIASTOLIC BLOOD PRESSURE: 78 MMHG | TEMPERATURE: 98.1 F | WEIGHT: 191 LBS

## 2023-10-31 DIAGNOSIS — H60.311 ACUTE DIFFUSE OTITIS EXTERNA OF RIGHT EAR: Primary | ICD-10-CM

## 2023-10-31 PROCEDURE — G8484 FLU IMMUNIZE NO ADMIN: HCPCS

## 2023-10-31 PROCEDURE — 3017F COLORECTAL CA SCREEN DOC REV: CPT

## 2023-10-31 PROCEDURE — 1036F TOBACCO NON-USER: CPT

## 2023-10-31 PROCEDURE — 99213 OFFICE O/P EST LOW 20 MIN: CPT

## 2023-10-31 PROCEDURE — 4130F TOPICAL PREP RX AOE: CPT

## 2023-10-31 PROCEDURE — G8427 DOCREV CUR MEDS BY ELIG CLIN: HCPCS

## 2023-10-31 PROCEDURE — G8417 CALC BMI ABV UP PARAM F/U: HCPCS

## 2023-10-31 RX ORDER — CIPROFLOXACIN AND DEXAMETHASONE 3; 1 MG/ML; MG/ML
4 SUSPENSION/ DROPS AURICULAR (OTIC) 2 TIMES DAILY
Qty: 1 EACH | Refills: 0 | Status: SHIPPED | OUTPATIENT
Start: 2023-10-31 | End: 2023-11-07

## 2023-10-31 ASSESSMENT — ENCOUNTER SYMPTOMS
SORE THROAT: 0
EYE DISCHARGE: 0
SINUS PRESSURE: 1
RESPIRATORY NEGATIVE: 1

## 2023-10-31 NOTE — PROGRESS NOTES
[address] [facility]  [address] [facility]  [address] [place] [address],  [address]  [place] [address]  Dept: [phone]    Monserrat Stuart is a 66 y.o. female Established patient, who presents to the walk-in clinic today with conditions/complaints as noted below:    Chief Complaint   Patient presents with    Otalgia     Pt c/o R ear pain and L ear feels \"muffled\". These sx started approx 10 hours ago, prior to that her ears were itchy. Pt denies fever, drainage, loss of hearing. She states that this has gotten worse throughout the day. HPI:     Patient is a 71-year-old female that presents today with concerns for a right ear infection. Notes that her ears have been itching the past few days. She's had mild sinus pressure and drainage as well but has allergies, no recent illness. States that she developed discomfort near the entrance of her ear canal this morning that's progressively worsened. Reports anterior tenderness down into her jaw and muffled hearing. Denies discharge. No fevers or chills.         Past Medical History:   Diagnosis Date    Allergic rhinitis     Cellulitis of left upper extremity 2/2/2021       Current Outpatient Medications   Medication Sig Dispense Refill    ciprofloxacin-dexamethasone (CIPRODEX) 0.3-0.1 % otic suspension Place 4 drops into the right ear 2 times daily for 7 days 1 each 0    omeprazole (PRILOSEC) 20 MG delayed release capsule TAKE 2 CAPSULES BY MOUTH EVERY  capsule 1    vitamin D3 (CHOLECALCIFEROL) 10 MCG (400 UNIT) TABS tablet Take 1 tablet by mouth daily      zinc gluconate 50 MG tablet Take 1 tablet by mouth daily      Ascorbic Acid (VITAMIN C) 250 MG tablet Take 1 tablet by mouth daily      Quercetin 250 MG TABS Take by mouth      fluticasone (FLONASE) 50 MCG/ACT nasal spray 1 spray by Each

## 2023-10-31 NOTE — PATIENT INSTRUCTIONS
Finish the entire course of treatment with antibiotic ear drops. Keep ear protected from water. Follow-up as needed.

## 2024-02-18 DIAGNOSIS — K21.9 GASTROESOPHAGEAL REFLUX DISEASE WITHOUT ESOPHAGITIS: ICD-10-CM

## 2024-02-19 RX ORDER — OMEPRAZOLE 20 MG/1
CAPSULE, DELAYED RELEASE ORAL
Qty: 180 CAPSULE | Refills: 0 | Status: SHIPPED | OUTPATIENT
Start: 2024-02-19

## 2024-02-19 NOTE — TELEPHONE ENCOUNTER
Last visit: 6/23  Last Med refill: 8/2023  Does patient have enough medication for 72 hours:   No future appointments.    Health Maintenance   Topic Date Due    COVID-19 Vaccine (1) Never done    DTaP/Tdap/Td vaccine (1 - Tdap) Never done    Diabetes screen  Never done    Lipids  Never done    Colorectal Cancer Screen  Never done    Shingles vaccine (1 of 2) Never done    Respiratory Syncytial Virus (RSV) Pregnant or age 60 yrs+ (1 - 1-dose 60+ series) Never done    Flu vaccine (1) Never done    Depression Screen  06/13/2024    Cervical cancer screen  08/03/2024    Breast cancer screen  01/10/2026    Hepatitis A vaccine  Aged Out    Hepatitis B vaccine  Aged Out    Hib vaccine  Aged Out    Polio vaccine  Aged Out    Meningococcal (ACWY) vaccine  Aged Out    Pneumococcal 0-64 years Vaccine  Aged Out    Hepatitis C screen  Discontinued    HIV screen  Discontinued       No results found for: \"LABA1C\"          ( goal A1C is < 7)   No components found for: \"LABMICR\"  No results found for: \"LDLCHOLESTEROL\", \"LDLCALC\"    (goal LDL is <100)   No results found for: \"AST\", \"ALT\", \"BUN\", \"CR\"  BP Readings from Last 3 Encounters:   10/31/23 124/78   06/13/23 132/76   03/20/23 120/76          (goal 120/80)    All Future Testing planned in [facility]  Lab Frequency Next Occurrence               Patient Active Problem List:     Seasonal allergic rhinitis due to pollen     HX: breast cancer

## 2024-05-16 DIAGNOSIS — K21.9 GASTROESOPHAGEAL REFLUX DISEASE WITHOUT ESOPHAGITIS: ICD-10-CM

## 2024-05-16 RX ORDER — OMEPRAZOLE 20 MG/1
CAPSULE, DELAYED RELEASE ORAL
Qty: 180 CAPSULE | Refills: 0 | Status: SHIPPED | OUTPATIENT
Start: 2024-05-16

## 2024-05-21 NOTE — TELEPHONE ENCOUNTER
Writer spoke with patient. Patient aware PCP is in [place] and is no longer in [place].     Patient made appt for 6/19/24 in [place].    Patient prefers [facility] in [place] instead of [facility].    She will contact [facility] and have them transfer the script over from [facility] and let us know if she needs anything from us.    Writer updated pharmacy in her chart

## 2024-06-19 ENCOUNTER — OFFICE VISIT (OUTPATIENT)
Dept: PRIMARY CARE CLINIC | Age: 64
End: 2024-06-19
Payer: COMMERCIAL

## 2024-06-19 VITALS
DIASTOLIC BLOOD PRESSURE: 76 MMHG | RESPIRATION RATE: 16 BRPM | WEIGHT: 190.2 LBS | SYSTOLIC BLOOD PRESSURE: 124 MMHG | HEART RATE: 73 BPM | BODY MASS INDEX: 33.69 KG/M2 | OXYGEN SATURATION: 99 %

## 2024-06-19 DIAGNOSIS — Z85.3 HISTORY OF CANCER OF LEFT BREAST: ICD-10-CM

## 2024-06-19 DIAGNOSIS — Z85.3 HISTORY OF RIGHT BREAST CANCER: ICD-10-CM

## 2024-06-19 DIAGNOSIS — I89.0 LYMPHEDEMA OF LEFT UPPER EXTREMITY: ICD-10-CM

## 2024-06-19 DIAGNOSIS — Z12.11 COLON CANCER SCREENING: ICD-10-CM

## 2024-06-19 DIAGNOSIS — Z00.00 ANNUAL PHYSICAL EXAM: Primary | ICD-10-CM

## 2024-06-19 DIAGNOSIS — K21.9 GASTROESOPHAGEAL REFLUX DISEASE WITHOUT ESOPHAGITIS: ICD-10-CM

## 2024-06-19 PROBLEM — Z15.89: Status: ACTIVE | Noted: 2023-11-22

## 2024-06-19 PROBLEM — Z17.0 MALIGNANT NEOPLASM OF LOWER-OUTER QUADRANT OF RIGHT BREAST OF FEMALE, ESTROGEN RECEPTOR POSITIVE (HCC): Status: ACTIVE | Noted: 2023-11-07

## 2024-06-19 PROBLEM — C50.412 MALIGNANT NEOPLASM OF UPPER-OUTER QUADRANT OF LEFT BREAST IN FEMALE, ESTROGEN RECEPTOR POSITIVE (HCC): Status: ACTIVE | Noted: 2024-02-14

## 2024-06-19 PROBLEM — C50.511 MALIGNANT NEOPLASM OF LOWER-OUTER QUADRANT OF RIGHT BREAST OF FEMALE, ESTROGEN RECEPTOR POSITIVE (HCC): Status: ACTIVE | Noted: 2023-11-07

## 2024-06-19 PROBLEM — Z17.0 MALIGNANT NEOPLASM OF UPPER-OUTER QUADRANT OF LEFT BREAST IN FEMALE, ESTROGEN RECEPTOR POSITIVE (HCC): Status: ACTIVE | Noted: 2024-02-14

## 2024-06-19 PROCEDURE — 99396 PREV VISIT EST AGE 40-64: CPT | Performed by: NURSE PRACTITIONER

## 2024-06-19 RX ORDER — ANASTROZOLE 1 MG/1
1 TABLET ORAL DAILY
COMMUNITY
Start: 2024-02-15 | End: 2025-02-09

## 2024-06-19 SDOH — ECONOMIC STABILITY: FOOD INSECURITY: WITHIN THE PAST 12 MONTHS, YOU WORRIED THAT YOUR FOOD WOULD RUN OUT BEFORE YOU GOT MONEY TO BUY MORE.: NEVER TRUE

## 2024-06-19 SDOH — ECONOMIC STABILITY: FOOD INSECURITY: WITHIN THE PAST 12 MONTHS, THE FOOD YOU BOUGHT JUST DIDN'T LAST AND YOU DIDN'T HAVE MONEY TO GET MORE.: NEVER TRUE

## 2024-06-19 SDOH — ECONOMIC STABILITY: INCOME INSECURITY: HOW HARD IS IT FOR YOU TO PAY FOR THE VERY BASICS LIKE FOOD, HOUSING, MEDICAL CARE, AND HEATING?: NOT HARD AT ALL

## 2024-06-19 ASSESSMENT — ENCOUNTER SYMPTOMS
RHINORRHEA: 0
ABDOMINAL PAIN: 0
DIARRHEA: 0
NAUSEA: 0
CHEST TIGHTNESS: 0
VOMITING: 0
SHORTNESS OF BREATH: 0
SORE THROAT: 0
COLOR CHANGE: 0

## 2024-06-19 ASSESSMENT — PATIENT HEALTH QUESTIONNAIRE - PHQ9
SUM OF ALL RESPONSES TO PHQ9 QUESTIONS 1 & 2: 0
1. LITTLE INTEREST OR PLEASURE IN DOING THINGS: NOT AT ALL
SUM OF ALL RESPONSES TO PHQ QUESTIONS 1-9: 0
2. FEELING DOWN, DEPRESSED OR HOPELESS: NOT AT ALL
SUM OF ALL RESPONSES TO PHQ QUESTIONS 1-9: 0

## 2024-06-19 NOTE — PROGRESS NOTES
Capillary Refill: Capillary refill takes less than 2 seconds.   Neurological:      General: No focal deficit present.      Mental Status: She is alert and oriented to person, place, and time.   Psychiatric:         Mood and Affect: Mood normal.         Behavior: Behavior normal.           :       Diagnosis Orders   1. Annual physical exam  Fecal DNA Colorectal cancer screening (Cologuard)    CBC with Auto Differential    Comprehensive Metabolic Panel    Lipid Panel    TSH With Reflex Ft4      2. Gastroesophageal reflux disease without esophagitis        3. History of right breast cancer        4. Lymphedema of left upper extremity        5. History of cancer of left breast        6. Colon cancer screening  Fecal DNA Colorectal cancer screening (Cologuard)                :   Assessment & Plan       1. Annual physical exam  Plan to update annual labs and Cologuard.  Discussed colonoscopy, she declines presently.  - Fecal DNA Colorectal cancer screening (Cologuard)  - CBC with Auto Differential; Future  - Comprehensive Metabolic Panel; Future  - Lipid Panel; Future  - TSH With Reflex Ft4; Future    2. Gastroesophageal reflux disease without esophagitis  Stable with PPI    3. History of right breast cancer  Recent lumpectomy and current treatment of right breast cancer    4. Lymphedema of left upper extremity  5. History of cancer of left breast  Chronic lymphedema, completed treatment of left breast cancer    6. Colon cancer screening  - Fecal DNA Colorectal cancer screening (Cologuard)    Return in about 1 year (around 6/19/2025) for annual exam .     Patient given educational materials - see patient instructions.  Discussed use, benefit, and side effects of prescribed medications.  All patient questions answered. Pt voiced understanding. Reviewed health maintenance.  Instructed to continue current medications, diet and exercise.  Patient agreed with treatment plan. Follow up as directed.       Electronicallysigned by

## 2024-08-20 DIAGNOSIS — K21.9 GASTROESOPHAGEAL REFLUX DISEASE WITHOUT ESOPHAGITIS: ICD-10-CM

## 2024-08-20 NOTE — TELEPHONE ENCOUNTER
Last OV:  6/19/2024    Next OV: 6/20/2025    Pharmacy:     [id number] - [place], [place] - [address] - P [phone] - F [phone]  [address] [facility] [address]  Phone: [phone] Fax: [phone]       Confirmed? Yes

## 2024-08-21 RX ORDER — OMEPRAZOLE 20 MG/1
CAPSULE, DELAYED RELEASE ORAL
Qty: 180 CAPSULE | Refills: 1 | Status: SHIPPED | OUTPATIENT
Start: 2024-08-21

## 2025-02-15 DIAGNOSIS — K21.9 GASTROESOPHAGEAL REFLUX DISEASE WITHOUT ESOPHAGITIS: ICD-10-CM

## 2025-02-17 ENCOUNTER — OFFICE VISIT (OUTPATIENT)
Dept: FAMILY MEDICINE CLINIC | Age: 65
End: 2025-02-17
Payer: COMMERCIAL

## 2025-02-17 VITALS
TEMPERATURE: 98.1 F | HEART RATE: 62 BPM | WEIGHT: 190 LBS | RESPIRATION RATE: 18 BRPM | SYSTOLIC BLOOD PRESSURE: 122 MMHG | OXYGEN SATURATION: 97 % | DIASTOLIC BLOOD PRESSURE: 70 MMHG | BODY MASS INDEX: 33.66 KG/M2

## 2025-02-17 DIAGNOSIS — R05.1 ACUTE COUGH: ICD-10-CM

## 2025-02-17 DIAGNOSIS — J06.9 VIRAL URI: Primary | ICD-10-CM

## 2025-02-17 PROCEDURE — 1036F TOBACCO NON-USER: CPT | Performed by: NURSE PRACTITIONER

## 2025-02-17 PROCEDURE — 99213 OFFICE O/P EST LOW 20 MIN: CPT | Performed by: NURSE PRACTITIONER

## 2025-02-17 PROCEDURE — G8427 DOCREV CUR MEDS BY ELIG CLIN: HCPCS | Performed by: NURSE PRACTITIONER

## 2025-02-17 PROCEDURE — G8417 CALC BMI ABV UP PARAM F/U: HCPCS | Performed by: NURSE PRACTITIONER

## 2025-02-17 PROCEDURE — 3017F COLORECTAL CA SCREEN DOC REV: CPT | Performed by: NURSE PRACTITIONER

## 2025-02-17 RX ORDER — BENZONATATE 200 MG/1
200 CAPSULE ORAL 3 TIMES DAILY PRN
Qty: 30 CAPSULE | Refills: 0 | Status: SHIPPED | OUTPATIENT
Start: 2025-02-17 | End: 2025-02-27

## 2025-02-17 ASSESSMENT — ENCOUNTER SYMPTOMS
SORE THROAT: 0
COUGH: 1
WHEEZING: 0
RHINORRHEA: 1

## 2025-02-17 NOTE — PROGRESS NOTES
[facility]    [facility]  [address]  [address]  [facility] [address]  Dept: [phone]     Brie Davis is a 64 y.o. female Established patient, who presents to the walk-in clinic today with conditions/complaints as noted below:    Chief Complaint   Patient presents with    Cough     X 4 days.     Headache    Facial Pain     Sinus pain/pressure.     Fatigue         HPI:     Cough  This is a new problem. The current episode started in the past 7 days (4 days). The problem occurs every few minutes. The cough is Non-productive. Associated symptoms include ear congestion, headaches, nasal congestion, postnasal drip and rhinorrhea. Pertinent negatives include no chest pain, chills, ear pain, fever, myalgias, sore throat, sweats or wheezing. Treatments tried: ibuprofen. The treatment provided moderate relief. There is no history of asthma or COPD.       Past Medical History:   Diagnosis Date    Allergic rhinitis     Cellulitis of left upper extremity 2/2/2021       Current Outpatient Medications   Medication Sig Dispense Refill    benzonatate (TESSALON) 200 MG capsule Take 1 capsule by mouth 3 times daily as needed for Cough 30 capsule 0    omeprazole (PRILOSEC) 20 MG delayed release capsule TAKE 2 CAPSULES BY MOUTH DAILY 180 capsule 1    anastrozole (ARIMIDEX) 1 MG tablet Take 1 tablet by mouth daily      vitamin D3 (CHOLECALCIFEROL) 10 MCG (400 UNIT) TABS tablet Take 1 tablet by mouth daily      zinc gluconate 50 MG tablet Take 1 tablet by mouth daily      Ascorbic Acid (VITAMIN C) 250 MG tablet Take 1 tablet by mouth daily      Quercetin 250 MG TABS Take by mouth      fluticasone (FLONASE) 50 MCG/ACT nasal spray 1 spray by Each Nostril route daily      pseudoephedrine (SUDAFED) 30 MG/5ML syrup Take 2.5 mLs by mouth 4 times daily as needed (Patient not

## 2025-07-25 ENCOUNTER — OFFICE VISIT (OUTPATIENT)
Dept: PRIMARY CARE CLINIC | Age: 65
End: 2025-07-25
Payer: COMMERCIAL

## 2025-07-25 VITALS
RESPIRATION RATE: 15 BRPM | HEIGHT: 63 IN | DIASTOLIC BLOOD PRESSURE: 76 MMHG | BODY MASS INDEX: 34.84 KG/M2 | OXYGEN SATURATION: 96 % | HEART RATE: 68 BPM | SYSTOLIC BLOOD PRESSURE: 118 MMHG | WEIGHT: 196.6 LBS

## 2025-07-25 DIAGNOSIS — Z12.11 ENCOUNTER FOR SCREENING FOR MALIGNANT NEOPLASM OF COLON: ICD-10-CM

## 2025-07-25 DIAGNOSIS — Z13.0 SCREENING, ANEMIA, DEFICIENCY, IRON: ICD-10-CM

## 2025-07-25 DIAGNOSIS — Z85.3 HISTORY OF RIGHT BREAST CANCER: ICD-10-CM

## 2025-07-25 DIAGNOSIS — I89.0 LYMPHEDEMA OF LEFT UPPER EXTREMITY: ICD-10-CM

## 2025-07-25 DIAGNOSIS — Z00.00 ANNUAL PHYSICAL EXAM: Primary | ICD-10-CM

## 2025-07-25 DIAGNOSIS — K21.9 GASTROESOPHAGEAL REFLUX DISEASE WITHOUT ESOPHAGITIS: ICD-10-CM

## 2025-07-25 DIAGNOSIS — Z13.6 SCREENING FOR CARDIOVASCULAR CONDITION: ICD-10-CM

## 2025-07-25 DIAGNOSIS — J30.1 SEASONAL ALLERGIC RHINITIS DUE TO POLLEN: ICD-10-CM

## 2025-07-25 DIAGNOSIS — M81.0 AGE-RELATED OSTEOPOROSIS WITHOUT CURRENT PATHOLOGICAL FRACTURE: ICD-10-CM

## 2025-07-25 DIAGNOSIS — Z13.1 SCREENING FOR DIABETES MELLITUS (DM): ICD-10-CM

## 2025-07-25 DIAGNOSIS — Z85.3 HISTORY OF CANCER OF LEFT BREAST: ICD-10-CM

## 2025-07-25 PROCEDURE — 99396 PREV VISIT EST AGE 40-64: CPT | Performed by: NURSE PRACTITIONER

## 2025-07-25 RX ORDER — ANTACID TABLETS 500 MG/1
1200 TABLET, CHEWABLE ORAL DAILY
COMMUNITY

## 2025-07-25 SDOH — ECONOMIC STABILITY: FOOD INSECURITY: WITHIN THE PAST 12 MONTHS, THE FOOD YOU BOUGHT JUST DIDN'T LAST AND YOU DIDN'T HAVE MONEY TO GET MORE.: NEVER TRUE

## 2025-07-25 SDOH — ECONOMIC STABILITY: FOOD INSECURITY: WITHIN THE PAST 12 MONTHS, YOU WORRIED THAT YOUR FOOD WOULD RUN OUT BEFORE YOU GOT MONEY TO BUY MORE.: NEVER TRUE

## 2025-07-25 ASSESSMENT — ENCOUNTER SYMPTOMS
COLOR CHANGE: 0
VOMITING: 0
ABDOMINAL PAIN: 0
SORE THROAT: 0
CHEST TIGHTNESS: 0
RHINORRHEA: 0
SHORTNESS OF BREATH: 0
NAUSEA: 0
DIARRHEA: 0

## 2025-07-25 ASSESSMENT — PATIENT HEALTH QUESTIONNAIRE - PHQ9
SUM OF ALL RESPONSES TO PHQ QUESTIONS 1-9: 0
SUM OF ALL RESPONSES TO PHQ QUESTIONS 1-9: 0
1. LITTLE INTEREST OR PLEASURE IN DOING THINGS: NOT AT ALL
SUM OF ALL RESPONSES TO PHQ QUESTIONS 1-9: 0
2. FEELING DOWN, DEPRESSED OR HOPELESS: NOT AT ALL
SUM OF ALL RESPONSES TO PHQ QUESTIONS 1-9: 0

## 2025-07-25 NOTE — PROGRESS NOTES
negative.      Objective:   /76   Pulse 68   Resp 15   Ht 1.6 m (5' 3\")   Wt 89.2 kg (196 lb 9.6 oz)   SpO2 96%   BMI 34.83 kg/m²     Physical Exam      Physical Exam  Vitals reviewed.   Constitutional:       General: She is not in acute distress.     Appearance: Normal appearance.   HENT:      Head: Normocephalic.   Eyes:      Pupils: Pupils are equal, round, and reactive to light.   Cardiovascular:      Rate and Rhythm: Normal rate and regular rhythm.      Pulses: Normal pulses.      Heart sounds: Normal heart sounds.   Pulmonary:      Effort: Pulmonary effort is normal.      Breath sounds: Normal breath sounds.   Abdominal:      General: There is no distension.   Musculoskeletal:         General: Normal range of motion.      Right lower leg: No edema.      Left lower leg: No edema.   Skin:     General: Skin is warm and dry.      Capillary Refill: Capillary refill takes less than 2 seconds.   Neurological:      General: No focal deficit present.      Mental Status: She is alert and oriented to person, place, and time.   Psychiatric:         Mood and Affect: Mood normal.         Behavior: Behavior normal.           :       Diagnosis Orders   1. Annual physical exam        2. Age-related osteoporosis without current pathological fracture        3. Gastroesophageal reflux disease without esophagitis        4. Seasonal allergic rhinitis due to pollen        5. Encounter for screening for malignant neoplasm of colon  Fecal DNA Colorectal cancer screening (Cologuard)      6. Lymphedema of left upper extremity        7. History of cancer of left breast        8. History of right breast cancer        9. Screening for cardiovascular condition  Lipid Panel      10. Screening for diabetes mellitus (DM)  Comprehensive Metabolic Panel      11. Screening, anemia, deficiency, iron  CBC with Auto Differential                :   Assessment & Plan     Assessment & Plan  1. Annual exam     2. Osteoporosis   - She

## 2025-08-14 DIAGNOSIS — K21.9 GASTROESOPHAGEAL REFLUX DISEASE WITHOUT ESOPHAGITIS: ICD-10-CM

## 2025-08-14 RX ORDER — OMEPRAZOLE 20 MG/1
40 CAPSULE, DELAYED RELEASE ORAL DAILY
Qty: 180 CAPSULE | Refills: 1 | Status: SHIPPED | OUTPATIENT
Start: 2025-08-14

## 2025-08-22 ENCOUNTER — COMMUNITY OUTREACH (OUTPATIENT)
Dept: PRIMARY CARE CLINIC | Age: 65
End: 2025-08-22